# Patient Record
Sex: FEMALE | Race: WHITE | NOT HISPANIC OR LATINO
[De-identification: names, ages, dates, MRNs, and addresses within clinical notes are randomized per-mention and may not be internally consistent; named-entity substitution may affect disease eponyms.]

---

## 2021-06-18 ENCOUNTER — RESULT REVIEW (OUTPATIENT)
Age: 61
End: 2021-06-18

## 2021-08-03 ENCOUNTER — APPOINTMENT (OUTPATIENT)
Dept: OPHTHALMOLOGY | Facility: CLINIC | Age: 61
End: 2021-08-03
Payer: COMMERCIAL

## 2021-08-03 ENCOUNTER — NON-APPOINTMENT (OUTPATIENT)
Age: 61
End: 2021-08-03

## 2021-08-03 PROCEDURE — 92133 CPTRZD OPH DX IMG PST SGM ON: CPT

## 2021-08-03 PROCEDURE — 92014 COMPRE OPH EXAM EST PT 1/>: CPT

## 2021-12-27 DIAGNOSIS — E78.5 HYPERLIPIDEMIA, UNSPECIFIED: ICD-10-CM

## 2021-12-27 DIAGNOSIS — E03.9 HYPOTHYROIDISM, UNSPECIFIED: ICD-10-CM

## 2021-12-27 PROBLEM — Z00.00 ENCOUNTER FOR PREVENTIVE HEALTH EXAMINATION: Status: ACTIVE | Noted: 2021-12-27

## 2021-12-27 RX ORDER — MELOXICAM 15 MG/1
TABLET ORAL
Refills: 0 | Status: ACTIVE | COMMUNITY

## 2021-12-27 RX ORDER — SIMVASTATIN 40 MG/1
TABLET, FILM COATED ORAL
Refills: 0 | Status: ACTIVE | COMMUNITY

## 2021-12-27 RX ORDER — LEVOTHYROXINE SODIUM 0.17 MG/1
TABLET ORAL
Refills: 0 | Status: ACTIVE | COMMUNITY

## 2021-12-27 RX ORDER — CYCLOBENZAPRINE HCL 5 MG
5 TABLET ORAL
Refills: 0 | Status: ACTIVE | COMMUNITY

## 2021-12-29 ENCOUNTER — APPOINTMENT (OUTPATIENT)
Dept: PHYSICAL MEDICINE AND REHAB | Facility: CLINIC | Age: 61
End: 2021-12-29

## 2021-12-29 VITALS
HEART RATE: 70 BPM | SYSTOLIC BLOOD PRESSURE: 130 MMHG | OXYGEN SATURATION: 100 % | DIASTOLIC BLOOD PRESSURE: 80 MMHG | TEMPERATURE: 98.7 F

## 2021-12-29 NOTE — HISTORY OF PRESENT ILLNESS
[FreeTextEntry1] : Pt continues with c/o low back pain with intermittent radiation of pain and paresthesia involving her bilateral lower extremities.  [FreeTextEntry2] :  for School 02/17/2011  [FreeTextEntry6] : prolonged sitting [FreeTextEntry4] : no [FreeTextEntry5] : no [Has the patient missed work because of the injury/illness?] : The patient has missed work because of the injury/illness. [Yes] : The patient is currently working.

## 2021-12-29 NOTE — ASSESSMENT
[FreeTextEntry1] : HEP \par Recheck 4 to 6 weeks  [Indicate if, in your opinion, the incident that the patient described was the competent medical cause of this injury/illness.] : The incident that the patient described was the competent medical cause of this injury/illness: Yes [Indicate if the patient's complaints are consistent with his/her history of the injury/illness.] : Indicate if the patient's complaints are consistent with his/her history of the injury/illness: Yes [Can the patient return to usual work activities as indicated? If yes, indicate date___] : The patient can return to usual work activities on [unfilled] [FreeTextEntry5] : 68

## 2021-12-29 NOTE — PHYSICAL EXAM
[FreeTextEntry1] : Examination of the Lumbar Spine \par Flexion 50 degrees\par Extension 10 degrees\par Lateral Bend R 26 degrees L 24 degrees \par \par Palpation of the Lumbar Spine: tenderness and spasm involving her bilateral lower lumbar paraspinal musculature\par \par \par MMT L/E: Intact \par SLR + 40 degrees bilaterally \par \par   [Normal] : The posterior cervical, anterior cervical, supraclavicular, axillary, femoral and inguinal nodes were non-tender and normal size [de-identified] : see exam  [de-identified] : see exam  [de-identified] : see exam

## 2022-02-23 ENCOUNTER — APPOINTMENT (OUTPATIENT)
Dept: PHYSICAL MEDICINE AND REHAB | Facility: CLINIC | Age: 62
End: 2022-02-23

## 2022-02-23 VITALS
SYSTOLIC BLOOD PRESSURE: 128 MMHG | TEMPERATURE: 97.8 F | DIASTOLIC BLOOD PRESSURE: 80 MMHG | HEART RATE: 68 BPM | OXYGEN SATURATION: 99 %

## 2022-02-23 NOTE — HISTORY OF PRESENT ILLNESS
[FreeTextEntry2] :  for School 02/17/2011  [FreeTextEntry1] : Pt continues with c/o low back pain with intermittent radiation of pain and paresthesia involving her bilateral lower extremities.  [FreeTextEntry6] : prolonged sitting [FreeTextEntry4] : no [FreeTextEntry5] : no [Has the patient missed work because of the injury/illness?] : The patient has missed work because of the injury/illness. [Yes] : The patient is currently working.

## 2022-02-23 NOTE — PHYSICAL EXAM
[FreeTextEntry1] : Examination of the Lumbar Spine \par Flexion 52 degrees\par Extension 8 degrees\par Lateral Bend R 24 degrees L 22 degrees \par \par Palpation of the Lumbar Spine: tenderness and spasm involving her bilateral lower lumbar paraspinal musculature\par \par \par MMT L/E: Intact \par SLR + 40 degrees bilaterally \par \par   [Normal] : The posterior cervical, anterior cervical, supraclavicular, axillary, femoral and inguinal nodes were non-tender and normal size [de-identified] : see exam  [de-identified] : see exam  [de-identified] : see exam

## 2022-02-23 NOTE — ASSESSMENT
[FreeTextEntry1] : HEP\par Flexeril 10mg po TID #90 prn  \par Recheck 4 to 6 weeks  [Indicate if, in your opinion, the incident that the patient described was the competent medical cause of this injury/illness.] : The incident that the patient described was the competent medical cause of this injury/illness: Yes [Indicate if the patient's complaints are consistent with his/her history of the injury/illness.] : Indicate if the patient's complaints are consistent with his/her history of the injury/illness: Yes [Yes] : Yes, it is consistent [Can the patient return to usual work activities as indicated? If yes, indicate date___] : The patient can return to usual work activities on [unfilled] [FreeTextEntry5] : 68

## 2022-03-30 ENCOUNTER — APPOINTMENT (OUTPATIENT)
Dept: PHYSICAL MEDICINE AND REHAB | Facility: CLINIC | Age: 62
End: 2022-03-30

## 2022-03-30 NOTE — PHYSICAL EXAM
[Normal] : The posterior cervical, anterior cervical, supraclavicular, axillary, femoral and inguinal nodes were non-tender and normal size [FreeTextEntry1] : Examination of the Lumbar Spine \par Flexion 50 degrees\par Extension 8 degrees\par Lateral Bend R 24 degrees L 22 degrees \par \par Palpation of the Lumbar Spine: tenderness and spasm involving her bilateral lower lumbar paraspinal musculature\par \par \par MMT L/E: Intact \par SLR + 40 degrees bilaterally \par \par   [de-identified] : see exam  [de-identified] : see exam  [de-identified] : see exam

## 2022-03-30 NOTE — HISTORY OF PRESENT ILLNESS
[Has the patient missed work because of the injury/illness?] : The patient has missed work because of the injury/illness. [Yes] : The patient is currently working. [FreeTextEntry1] : Pt continues with c/o low back pain with intermittent radiation of pain and paresthesia involving her bilateral lower extremities. Pt reports improvement with flexeril.  [FreeTextEntry2] :  for School 02/17/2011  [FreeTextEntry6] : prolonged sitting [FreeTextEntry4] : no [FreeTextEntry5] : no

## 2022-03-30 NOTE — ASSESSMENT
[Indicate if, in your opinion, the incident that the patient described was the competent medical cause of this injury/illness.] : The incident that the patient described was the competent medical cause of this injury/illness: Yes [Indicate if the patient's complaints are consistent with his/her history of the injury/illness.] : Indicate if the patient's complaints are consistent with his/her history of the injury/illness: Yes [Yes] : Yes, it is consistent [Can the patient return to usual work activities as indicated? If yes, indicate date___] : The patient can return to usual work activities on [unfilled] [FreeTextEntry1] : HEP\par Continue with flexeril PRN \par Recheck 4 to 6 weeks  [FreeTextEntry5] : 68

## 2022-05-02 ENCOUNTER — APPOINTMENT (OUTPATIENT)
Dept: PHYSICAL MEDICINE AND REHAB | Facility: CLINIC | Age: 62
End: 2022-05-02
Payer: COMMERCIAL

## 2022-05-02 PROCEDURE — 20610 DRAIN/INJ JOINT/BURSA W/O US: CPT

## 2022-05-02 PROCEDURE — 99213 OFFICE O/P EST LOW 20 MIN: CPT | Mod: 25

## 2022-05-02 RX ORDER — LIDOCAINE HYDROCHLORIDE 10 MG/ML
1 INJECTION, SOLUTION INFILTRATION; PERINEURAL
Refills: 0 | Status: COMPLETED | OUTPATIENT
Start: 2022-05-02

## 2022-05-02 RX ORDER — DEXAMETHASONE SODIUM PHOSPHATE 4 MG/ML
4 INJECTION, SOLUTION INTRAMUSCULAR; INTRAVENOUS
Qty: 0 | Refills: 0 | Status: COMPLETED | OUTPATIENT
Start: 2022-05-02

## 2022-05-02 NOTE — HISTORY OF PRESENT ILLNESS
[FreeTextEntry1] : Pt is a 62 year old female with c/o right knee pain with limited ROM. Pt reports improvement involving last injection to the Pes Anserine Bursa over the past 2 weeks reporting increasing knee pain.

## 2022-05-02 NOTE — ASSESSMENT
[FreeTextEntry1] : Inject right knee Pes Anserine Bursa 4 cc 1 % Lidocaine HCL, 1 cc Dexamethasone\par PT decline diagnostic testing. \par Pt declines ortho consult.\par Recheck in 4 weeks

## 2022-05-02 NOTE — PHYSICAL EXAM
[FreeTextEntry1] : Sterile Technique Injection \par 1 Syringes of 4 cc 1 % Lidocaine HCL \par                        1 cc Dexamethasone \par \par Right Pes Anserine Bursa \par \par Ice injection site PRN \par Injection tolerated well.\par \par \par Knee Examination \par Flexion: 110 degrees\par Extension: full\par \par Palpation:Medial joint line and Pes Anserine Bursa tender.\par \par MMT: Grossly intact\par \par Swelling: -\par No increase temperature: -\par Patella Compression: -\par Grayson Test: with pain\par Negative gross instability \par

## 2022-05-11 ENCOUNTER — APPOINTMENT (OUTPATIENT)
Dept: PHYSICAL MEDICINE AND REHAB | Facility: CLINIC | Age: 62
End: 2022-05-11
Payer: OTHER MISCELLANEOUS

## 2022-05-11 PROCEDURE — 99072 ADDL SUPL MATRL&STAF TM PHE: CPT

## 2022-05-11 PROCEDURE — 99213 OFFICE O/P EST LOW 20 MIN: CPT

## 2022-05-11 NOTE — PHYSICAL EXAM
[Normal] : The posterior cervical, anterior cervical, supraclavicular, axillary, femoral and inguinal nodes were non-tender and normal size [FreeTextEntry1] : Examination of the Lumbar Spine \par Flexion 52 degrees\par Extension 10 degrees\par Lateral Bend R 27 degrees L 20 degrees \par \par Palpation of the Lumbar Spine: tenderness and spasm involving her bilateral lower lumbar paraspinal musculature\par \par \par MMT L/E: Intact \par \par   [de-identified] : see exam  [de-identified] : see exam  [de-identified] : see exam

## 2022-06-07 ENCOUNTER — APPOINTMENT (OUTPATIENT)
Dept: PHYSICAL MEDICINE AND REHAB | Facility: CLINIC | Age: 62
End: 2022-06-07

## 2022-06-25 ENCOUNTER — RESULT REVIEW (OUTPATIENT)
Age: 62
End: 2022-06-25

## 2022-06-29 ENCOUNTER — APPOINTMENT (OUTPATIENT)
Dept: PHYSICAL MEDICINE AND REHAB | Facility: CLINIC | Age: 62
End: 2022-06-29
Payer: OTHER MISCELLANEOUS

## 2022-06-29 PROCEDURE — 99072 ADDL SUPL MATRL&STAF TM PHE: CPT

## 2022-06-29 PROCEDURE — 99213 OFFICE O/P EST LOW 20 MIN: CPT

## 2022-06-29 NOTE — ASSESSMENT
[Indicate if, in your opinion, the incident that the patient described was the competent medical cause of this injury/illness.] : The incident that the patient described was the competent medical cause of this injury/illness: Yes [Indicate if the patient's complaints are consistent with his/her history of the injury/illness.] : Indicate if the patient's complaints are consistent with his/her history of the injury/illness: Yes [Yes] : Yes, it is consistent [Can the patient return to usual work activities as indicated? If yes, indicate date___] : The patient can return to usual work activities on [unfilled] [FreeTextEntry1] : Request series of 6 trigger point injections to L/S. The goal of which is to dissipate persistent trigger points and muscle spasm involving the lumbar and gluteal musculature. \par \par HEP\par Continue with flexeril PRN \par \par Recheck 4 to 6 weeks  [FreeTextEntry5] : 68

## 2022-06-29 NOTE — HISTORY OF PRESENT ILLNESS
[Has the patient missed work because of the injury/illness?] : The patient has missed work because of the injury/illness. [Yes] : The patient is currently working. [FreeTextEntry1] : Pt continues with c/o low back pain with intermittent radiation of pain Pt reports some improvement with Flexeril, as well as using heat, ice and HEP. [FreeTextEntry2] :  for School 02/17/2011  [FreeTextEntry6] : prolonged sitting [FreeTextEntry4] : no [FreeTextEntry5] : no

## 2022-06-29 NOTE — PHYSICAL EXAM
[Normal] : The posterior cervical, anterior cervical, supraclavicular, axillary, femoral and inguinal nodes were non-tender and normal size [FreeTextEntry1] : Examination of the Lumbar Spine \par Flexion 50 degrees\par Extension 10 degrees\par Lateral Bend R 27 degrees L 25 degrees \par \par Palpation of the Lumbar Spine: Decreased tenderness and spasm involving her bilateral lower lumbar paraspinal musculature. Trigger points left gluteal musculature. \par \par \par MMT L/E: Intact \par \par   [de-identified] : see exam  [de-identified] : see exam  [de-identified] : see exam

## 2022-07-20 ENCOUNTER — APPOINTMENT (OUTPATIENT)
Dept: PHYSICAL MEDICINE AND REHAB | Facility: CLINIC | Age: 62
End: 2022-07-20

## 2022-07-20 PROCEDURE — 99072 ADDL SUPL MATRL&STAF TM PHE: CPT

## 2022-07-20 PROCEDURE — 99212 OFFICE O/P EST SF 10 MIN: CPT

## 2022-07-20 NOTE — PHYSICAL EXAM
[FreeTextEntry1] : Examination of the Lumbar Spine \par Flexion 45 degrees\par Extension 5 degrees\par Lateral Bend R 20 degrees L 22 degrees \par \par Palpation of the Lumbar Spine: tenderness and spasm involving her bilateral lower lumbar paraspinal musculature\par Trigger points bilateral gluteal musculature with greater involvement on the left\par \par MMT L/E: Intact \par SLR + 40 degrees bilaterally \par \par   [Normal] : The posterior cervical, anterior cervical, supraclavicular, axillary, femoral and inguinal nodes were non-tender and normal size [de-identified] : see exam  [de-identified] : see exam  [de-identified] : see exam

## 2022-07-20 NOTE — HISTORY OF PRESENT ILLNESS
[FreeTextEntry1] : Pt continues with c/o low back pain with intermittent radiation of pain and paresthesia involving her bilateral lower extremities.  Using Flexeril on a as needed basis.  Patient reports that she has received authorization for 1 trigger point injection. [FreeTextEntry2] :  for School 02/17/2011  [FreeTextEntry6] : prolonged sitting [FreeTextEntry4] : no [FreeTextEntry5] : no [Has the patient missed work because of the injury/illness?] : The patient has missed work because of the injury/illness. [Yes] : The patient is currently working.

## 2022-07-20 NOTE — ASSESSMENT
[FreeTextEntry1] : HEP\par Continue with flexeril PRN\par Patient to schedule trigger point injection to the lumbar spine and gluteal musculature\par Recheck 4 weeks [Indicate if, in your opinion, the incident that the patient described was the competent medical cause of this injury/illness.] : The incident that the patient described was the competent medical cause of this injury/illness: Yes [Indicate if the patient's complaints are consistent with his/her history of the injury/illness.] : Indicate if the patient's complaints are consistent with his/her history of the injury/illness: Yes [Yes] : Yes, it is consistent [Can the patient return to usual work activities as indicated? If yes, indicate date___] : The patient can return to usual work activities on [unfilled] [FreeTextEntry5] : 68

## 2022-07-27 ENCOUNTER — APPOINTMENT (OUTPATIENT)
Dept: PHYSICAL MEDICINE AND REHAB | Facility: CLINIC | Age: 62
End: 2022-07-27

## 2022-08-02 ENCOUNTER — APPOINTMENT (OUTPATIENT)
Dept: PHYSICAL MEDICINE AND REHAB | Facility: CLINIC | Age: 62
End: 2022-08-02

## 2022-08-02 PROCEDURE — 99213 OFFICE O/P EST LOW 20 MIN: CPT | Mod: 25

## 2022-08-02 PROCEDURE — 20553 NJX 1/MLT TRIGGER POINTS 3/>: CPT

## 2022-08-02 PROCEDURE — 99072 ADDL SUPL MATRL&STAF TM PHE: CPT

## 2022-08-02 RX ADMIN — Medication 10 %: at 00:00

## 2022-08-10 ENCOUNTER — NON-APPOINTMENT (OUTPATIENT)
Age: 62
End: 2022-08-10

## 2022-08-10 ENCOUNTER — APPOINTMENT (OUTPATIENT)
Dept: OPHTHALMOLOGY | Facility: CLINIC | Age: 62
End: 2022-08-10

## 2022-08-10 PROCEDURE — 92250 FUNDUS PHOTOGRAPHY W/I&R: CPT

## 2022-08-10 PROCEDURE — 92014 COMPRE OPH EXAM EST PT 1/>: CPT

## 2022-08-31 ENCOUNTER — APPOINTMENT (OUTPATIENT)
Dept: PHYSICAL MEDICINE AND REHAB | Facility: CLINIC | Age: 62
End: 2022-08-31

## 2022-08-31 PROCEDURE — 99213 OFFICE O/P EST LOW 20 MIN: CPT

## 2022-08-31 PROCEDURE — 99072 ADDL SUPL MATRL&STAF TM PHE: CPT

## 2022-08-31 NOTE — PHYSICAL EXAM
[Normal] : The posterior cervical, anterior cervical, supraclavicular, axillary, femoral and inguinal nodes were non-tender and normal size [FreeTextEntry1] : Examination of the Lumbar Spine \par Flexion 48 degrees\par Extension 8 degrees\par Lateral Bend R 20 degrees L 22 degrees \par \par Palpation of the Lumbar Spine: tenderness and spasm involving her bilateral lower lumbar paraspinal musculature\par Trigger points bilateral gluteal musculature with greater involvement on the left.  However some improvement following last trigger point injection.\par \par MMT L/E: Intact \par SLR + 40 degrees bilaterally \par \par   [de-identified] : see exam  [de-identified] : see exam  [de-identified] : see exam

## 2022-08-31 NOTE — PROCEDURE
[de-identified] : Sterile Technique Injection \par 2 Syringes of 5 cc 1 % Lidocaine HCL \par \par Bilateral Gluteus medius \par Bilateral Gluteus yoana \par \par Ice injection site PRN \par Injection tolerated well.\par \par \par \par \par

## 2022-08-31 NOTE — ASSESSMENT
[Indicate if, in your opinion, the incident that the patient described was the competent medical cause of this injury/illness.] : The incident that the patient described was the competent medical cause of this injury/illness: Yes [Indicate if the patient's complaints are consistent with his/her history of the injury/illness.] : Indicate if the patient's complaints are consistent with his/her history of the injury/illness: Yes [Yes] : Yes, it is consistent [Can the patient return to usual work activities as indicated? If yes, indicate date___] : The patient can return to usual work activities on [unfilled] [FreeTextEntry1] : HEP\par Continue with flexeril PRN\par Request authorization for 8 sessions of trigger point injections to the lumbar paraspinal and gluteal musculature.  MMI has not yet been achieved.  Additional trigger point injections would be beneficial and decreasing pain, and dissipating persistent muscle spasm.\par Recheck 4 weeks [FreeTextEntry5] : 68

## 2022-08-31 NOTE — HISTORY OF PRESENT ILLNESS
[Has the patient missed work because of the injury/illness?] : The patient has missed work because of the injury/illness. [Yes] : The patient is currently working. [FreeTextEntry1] : Pt continues with c/o low back pain with intermittent radiation of pain and paresthesia involving her bilateral lower extremities.  However does report she noted some improvement following trigger point injections involving bilateral gluteal musculature. [FreeTextEntry2] :  for School 02/17/2011  [FreeTextEntry6] : prolonged sitting [FreeTextEntry4] : no [FreeTextEntry5] : no

## 2022-11-09 ENCOUNTER — APPOINTMENT (OUTPATIENT)
Dept: PHYSICAL MEDICINE AND REHAB | Facility: CLINIC | Age: 62
End: 2022-11-09

## 2022-11-09 PROCEDURE — 99213 OFFICE O/P EST LOW 20 MIN: CPT

## 2022-11-09 PROCEDURE — 99072 ADDL SUPL MATRL&STAF TM PHE: CPT

## 2022-11-09 NOTE — PHYSICAL EXAM
[Normal] : The posterior cervical, anterior cervical, supraclavicular, axillary, femoral and inguinal nodes were non-tender and normal size [FreeTextEntry1] : Examination of the Lumbar Spine \par Flexion 50 degrees\par Extension 8 degrees\par Lateral Bend R 20 degrees L 22 degrees \par \par Palpation of the Lumbar Spine: tenderness and spasm involving her bilateral lower lumbar paraspinal musculature\par Trigger points bilateral gluteal musculature with greater involvement on the left. \par \par MMT L/E: Intact \par SLR + 40 degrees bilaterally \par \par   [de-identified] : see exam  [de-identified] : see exam  [de-identified] : see exam

## 2022-11-09 NOTE — HISTORY OF PRESENT ILLNESS
[Has the patient missed work because of the injury/illness?] : The patient has missed work because of the injury/illness. [Yes] : The patient is currently working. [FreeTextEntry1] : Pt continues with c/o low back pain with intermittent radiation of pain and paresthesia involving her bilateral lower extremities.  Pt reports  some improvement with trigger point injections.  [FreeTextEntry2] :  for School 02/17/2011  [FreeTextEntry6] : prolonged sitting [FreeTextEntry4] : no [FreeTextEntry5] : no

## 2022-11-09 NOTE — PROCEDURE
[de-identified] : Sterile Technique Injection \par 2 Syringes of 5 cc 1 % Lidocaine HCL \par \par Bilateral Gluteus medius \par Bilateral Gluteus yoana \par \par Ice injection site PRN \par Injection tolerated well.\par \par \par \par \par

## 2022-11-09 NOTE — ASSESSMENT
[Indicate if, in your opinion, the incident that the patient described was the competent medical cause of this injury/illness.] : The incident that the patient described was the competent medical cause of this injury/illness: Yes [Indicate if the patient's complaints are consistent with his/her history of the injury/illness.] : Indicate if the patient's complaints are consistent with his/her history of the injury/illness: Yes [Yes] : Yes, it is consistent [Can the patient return to usual work activities as indicated? If yes, indicate date___] : The patient can return to usual work activities on [unfilled] [FreeTextEntry1] : HEP\par Continue with flexeril PRN\par \par Request authorization for 8 sessions of trigger point injections to the lumbar paraspinal and gluteal musculature.  MMI has not yet been achieved.  Additional trigger point injections would be beneficial and decreasing pain, and dissipating persistent muscle spasm.\par Recheck 4 weeks [FreeTextEntry5] : 68

## 2022-11-16 ENCOUNTER — APPOINTMENT (OUTPATIENT)
Dept: PHYSICAL MEDICINE AND REHAB | Facility: CLINIC | Age: 62
End: 2022-11-16

## 2022-11-16 PROCEDURE — 20553 NJX 1/MLT TRIGGER POINTS 3/>: CPT

## 2022-11-16 PROCEDURE — 99072 ADDL SUPL MATRL&STAF TM PHE: CPT

## 2022-11-16 PROCEDURE — 99213 OFFICE O/P EST LOW 20 MIN: CPT | Mod: 25

## 2022-11-16 NOTE — PROCEDURE
[de-identified] : Sterile Technique Injection \par 2 Syringes of 5 cc 1 % Lidocaine HCL \par \par left Gluteus medius \par left Gluteus yoana \par \par Ice injection site PRN \par Injection tolerated well.\par \par \par \par \par Flexion 50 degrees\par Extension 10 degrees\par Lateral Bend R 18 degrees L 20 degrees \par \par Palp: Trigger point involving the bilateral gluteal musculature

## 2022-11-23 ENCOUNTER — APPOINTMENT (OUTPATIENT)
Dept: PHYSICAL MEDICINE AND REHAB | Facility: CLINIC | Age: 62
End: 2022-11-23

## 2022-11-23 PROCEDURE — 99213 OFFICE O/P EST LOW 20 MIN: CPT | Mod: 25

## 2022-11-23 PROCEDURE — 99072 ADDL SUPL MATRL&STAF TM PHE: CPT

## 2022-11-23 PROCEDURE — 20553 NJX 1/MLT TRIGGER POINTS 3/>: CPT

## 2022-11-29 ENCOUNTER — APPOINTMENT (OUTPATIENT)
Dept: PHYSICAL MEDICINE AND REHAB | Facility: CLINIC | Age: 62
End: 2022-11-29

## 2022-11-29 DIAGNOSIS — M77.9 ENTHESOPATHY, UNSPECIFIED: ICD-10-CM

## 2022-11-29 DIAGNOSIS — M17.10 UNILATERAL PRIMARY OSTEOARTHRITIS, UNSPECIFIED KNEE: ICD-10-CM

## 2022-11-29 DIAGNOSIS — M70.50 OTHER BURSITIS OF KNEE, UNSPECIFIED KNEE: ICD-10-CM

## 2022-11-29 PROCEDURE — 20610 DRAIN/INJ JOINT/BURSA W/O US: CPT

## 2022-11-29 PROCEDURE — 99214 OFFICE O/P EST MOD 30 MIN: CPT | Mod: 25

## 2022-11-29 RX ADMIN — Medication 4 %: at 00:00

## 2022-11-29 NOTE — PROCEDURE
[de-identified] : Pt is complaining of right knee pain. She is scheduled for a total knee replacement in June 2023. \par \par \par EXAMINATION OF THE RIGHT KNEE:   \par \par Flexion: 105 degrees\par Extension: Full\par Palpation: Medial joint line and pes anserine bursa tender\par MMT: Grossly intact\par Swelling: -\par No increased temperature\par Negative patella compression\par Alina test positive with pain\par Negative gross instability\par Crepitus with ranging\par \par \par Sterile Technique Injection \par 1 Syringe of 4 cc 1 % Lidocaine HCL; 1 cc dexamethasone \par \par Right Pes anserine Bursa \par \par Ice injection site PRN \par Injection tolerated well.\par \par    \par \par \par \par

## 2022-11-30 ENCOUNTER — APPOINTMENT (OUTPATIENT)
Dept: PHYSICAL MEDICINE AND REHAB | Facility: CLINIC | Age: 62
End: 2022-11-30

## 2022-11-30 PROCEDURE — 99072 ADDL SUPL MATRL&STAF TM PHE: CPT

## 2022-11-30 PROCEDURE — 20553 NJX 1/MLT TRIGGER POINTS 3/>: CPT | Mod: 59

## 2022-11-30 PROCEDURE — 99213 OFFICE O/P EST LOW 20 MIN: CPT | Mod: 25

## 2022-11-30 NOTE — PROCEDURE
[de-identified] : Sterile Technique Injection \par 2 Syringes of 5 cc 1 % Lidocaine HCL \par \par left Gluteus medius \par left Gluteus yoana \par Left piriformis\par Ice injection site PRN \par Injection tolerated well.\par \par \par \par \par Flexion 45 degrees\par Extension 10 degrees\par Lateral Bend R 22 degrees L 20 degrees \par \par Palp: Trigger point involving the bilateral gluteal musculature with greater involvement on the left

## 2022-12-07 ENCOUNTER — APPOINTMENT (OUTPATIENT)
Dept: PHYSICAL MEDICINE AND REHAB | Facility: CLINIC | Age: 62
End: 2022-12-07

## 2022-12-07 PROCEDURE — 99072 ADDL SUPL MATRL&STAF TM PHE: CPT

## 2022-12-07 PROCEDURE — 99213 OFFICE O/P EST LOW 20 MIN: CPT | Mod: 25

## 2022-12-07 PROCEDURE — 20553 NJX 1/MLT TRIGGER POINTS 3/>: CPT

## 2022-12-07 NOTE — PROCEDURE
[de-identified] : Sterile Technique Injection \par 2 Syringes of 5 cc 1 % Lidocaine HCL \par \par left Gluteus medius \par left Gluteus yoana \par Left piriformis\par Ice injection site PRN \par Injection tolerated well.\par \par \par Flexion 45 degrees\par Extension 10 degrees\par Lateral Bend R 22 degrees L 20 degrees \par \par Palp: Trigger point involving the bilateral gluteal musculature with greater involvement on the left

## 2022-12-14 ENCOUNTER — APPOINTMENT (OUTPATIENT)
Dept: PHYSICAL MEDICINE AND REHAB | Facility: CLINIC | Age: 62
End: 2022-12-14

## 2022-12-14 PROCEDURE — 99213 OFFICE O/P EST LOW 20 MIN: CPT | Mod: 25

## 2022-12-14 PROCEDURE — 99072 ADDL SUPL MATRL&STAF TM PHE: CPT

## 2022-12-14 PROCEDURE — 20553 NJX 1/MLT TRIGGER POINTS 3/>: CPT

## 2022-12-14 NOTE — PROCEDURE
[de-identified] : Sterile Technique Injection \par 2 Syringes of 5 cc 1 % Lidocaine HCL \par \par left Gluteus medius \par left Gluteus yoana \par Left piriformis\par Ice injection site PRN \par Injection tolerated well.\par \par \par Flexion 45 degrees\par Extension 10 degrees\par Lateral Bend R 22 degrees L 20 degrees \par \par Palp: Trigger point involving the bilateral gluteal musculature with greater involvement on the left

## 2022-12-19 ENCOUNTER — APPOINTMENT (OUTPATIENT)
Dept: PHYSICAL MEDICINE AND REHAB | Facility: CLINIC | Age: 62
End: 2022-12-19

## 2022-12-21 ENCOUNTER — APPOINTMENT (OUTPATIENT)
Dept: PHYSICAL MEDICINE AND REHAB | Facility: CLINIC | Age: 62
End: 2022-12-21

## 2022-12-21 PROCEDURE — 20553 NJX 1/MLT TRIGGER POINTS 3/>: CPT

## 2022-12-21 PROCEDURE — 99072 ADDL SUPL MATRL&STAF TM PHE: CPT

## 2022-12-21 PROCEDURE — 99213 OFFICE O/P EST LOW 20 MIN: CPT | Mod: 25

## 2022-12-21 NOTE — PROCEDURE
[de-identified] : Sterile Technique Injection \par 2 Syringes of 5 cc 1 % Lidocaine HCL \par \par left Gluteus medius \par left Gluteus yoana \par Left piriformis\par Ice injection site PRN \par Injection tolerated well.\par Patient reports trigger point injections are beneficial in decreasing pain\par \par Flexion 50 degrees\par Extension 10 degrees\par Lateral Bend R 28 degrees L 22 degrees \par \par Palp: Trigger points left gluteal musculature improving but persist

## 2022-12-28 ENCOUNTER — APPOINTMENT (OUTPATIENT)
Dept: PHYSICAL MEDICINE AND REHAB | Facility: CLINIC | Age: 62
End: 2022-12-28

## 2022-12-28 PROCEDURE — 20553 NJX 1/MLT TRIGGER POINTS 3/>: CPT

## 2022-12-28 PROCEDURE — 99072 ADDL SUPL MATRL&STAF TM PHE: CPT

## 2022-12-28 PROCEDURE — 99213 OFFICE O/P EST LOW 20 MIN: CPT | Mod: 25

## 2022-12-28 NOTE — PROCEDURE
[de-identified] : Sterile Technique Injection \par 2 Syringes of 4 cc 1 % Lidocaine HCL \par                         1 cc dexamethasone \par left Gluteus medius \par left Gluteus yoana \par Left piriformis\par Ice injection site PRN \par Injection tolerated well.\par Patient reports trigger point injections are beneficial in decreasing pain\par \par Flexion 52 degrees\par Extension 12 degrees\par Lateral Bend R 28 degrees L 22 degrees \par \par Palp: Trigger points left gluteal musculature improving but persist

## 2023-01-04 ENCOUNTER — APPOINTMENT (OUTPATIENT)
Dept: PHYSICAL MEDICINE AND REHAB | Facility: CLINIC | Age: 63
End: 2023-01-04
Payer: OTHER MISCELLANEOUS

## 2023-01-04 PROCEDURE — 20553 NJX 1/MLT TRIGGER POINTS 3/>: CPT

## 2023-01-04 PROCEDURE — 99072 ADDL SUPL MATRL&STAF TM PHE: CPT

## 2023-01-04 PROCEDURE — 99213 OFFICE O/P EST LOW 20 MIN: CPT | Mod: 25

## 2023-01-04 NOTE — PROCEDURE
[de-identified] : Sterile Technique Injection \par 2 Syringes of 4 cc 1 % Lidocaine HCL \par                         1 cc dexamethasone \par left Gluteus medius \par left Gluteus yoana \par Left piriformis\par Ice injection site PRN \par Injection tolerated well.\par Patient reports trigger point injections are beneficial in decreasing pain\par \par Flexion 50 degrees\par Extension 12 degrees\par Lateral Bend R 28 degrees L 22 degrees \par \par Palp: Trigger points left gluteal musculature improving but persist

## 2023-01-09 ENCOUNTER — APPOINTMENT (OUTPATIENT)
Dept: PHYSICAL MEDICINE AND REHAB | Facility: CLINIC | Age: 63
End: 2023-01-09
Payer: OTHER MISCELLANEOUS

## 2023-01-09 PROCEDURE — 99213 OFFICE O/P EST LOW 20 MIN: CPT

## 2023-01-09 PROCEDURE — 99072 ADDL SUPL MATRL&STAF TM PHE: CPT

## 2023-01-09 NOTE — HISTORY OF PRESENT ILLNESS
[FreeTextEntry1] : Patient reports trigger point injection therapy has been beneficial in terms of decreasing her low back pain and improving range of motion and level of function. [FreeTextEntry2] :  for School 02/17/2011  [FreeTextEntry6] : prolonged sitting [FreeTextEntry4] : no [FreeTextEntry5] : no [Has the patient missed work because of the injury/illness?] : The patient has missed work because of the injury/illness. [Yes] : The patient is currently working.

## 2023-01-09 NOTE — ASSESSMENT
[FreeTextEntry1] : HEP\par \par Recheck 4 weeks [Indicate if, in your opinion, the incident that the patient described was the competent medical cause of this injury/illness.] : The incident that the patient described was the competent medical cause of this injury/illness: Yes [Indicate if the patient's complaints are consistent with his/her history of the injury/illness.] : Indicate if the patient's complaints are consistent with his/her history of the injury/illness: Yes [Yes] : Yes, it is consistent [Can the patient return to usual work activities as indicated? If yes, indicate date___] : The patient can return to usual work activities on [unfilled] [FreeTextEntry5] : 68

## 2023-01-09 NOTE — PROCEDURE
[de-identified] : Sterile Technique Injection \par 2 Syringes of 5 cc 1 % Lidocaine HCL \par \par Bilateral Gluteus medius \par Bilateral Gluteus yoana \par \par Ice injection site PRN \par Injection tolerated well.\par \par \par \par \par

## 2023-01-09 NOTE — PHYSICAL EXAM
[FreeTextEntry1] : Examination of the Lumbar Spine \par Flexion 55 degrees\par Extension 10 degrees\par Lateral Bend R 25 degrees L 27 degrees \par \par Palpation of the Lumbar Spine: decrease tenderness and spasm involving her bilateral lower lumbar paraspinal musculature\par Trigger points bilateral gluteal musculature improved\par MMT L/E: Intact \par SLR + 40 degrees bilaterally \par \par   [Normal] : The posterior cervical, anterior cervical, supraclavicular, axillary, femoral and inguinal nodes were non-tender and normal size [de-identified] : see exam  [de-identified] : see exam  [de-identified] : see exam

## 2023-02-13 ENCOUNTER — APPOINTMENT (OUTPATIENT)
Dept: OPHTHALMOLOGY | Facility: CLINIC | Age: 63
End: 2023-02-13
Payer: COMMERCIAL

## 2023-02-13 ENCOUNTER — NON-APPOINTMENT (OUTPATIENT)
Age: 63
End: 2023-02-13

## 2023-02-13 PROCEDURE — 92201 OPSCPY EXTND RTA DRAW UNI/BI: CPT

## 2023-02-13 PROCEDURE — 92014 COMPRE OPH EXAM EST PT 1/>: CPT

## 2023-03-06 ENCOUNTER — NON-APPOINTMENT (OUTPATIENT)
Age: 63
End: 2023-03-06

## 2023-03-06 ENCOUNTER — APPOINTMENT (OUTPATIENT)
Dept: OPHTHALMOLOGY | Facility: CLINIC | Age: 63
End: 2023-03-06
Payer: COMMERCIAL

## 2023-03-06 PROCEDURE — 92201 OPSCPY EXTND RTA DRAW UNI/BI: CPT

## 2023-03-06 PROCEDURE — 92014 COMPRE OPH EXAM EST PT 1/>: CPT

## 2023-04-10 ENCOUNTER — APPOINTMENT (OUTPATIENT)
Dept: PHYSICAL MEDICINE AND REHAB | Facility: CLINIC | Age: 63
End: 2023-04-10
Payer: OTHER MISCELLANEOUS

## 2023-04-10 PROCEDURE — 99213 OFFICE O/P EST LOW 20 MIN: CPT

## 2023-04-10 NOTE — HISTORY OF PRESENT ILLNESS
[Has the patient missed work because of the injury/illness?] : The patient has missed work because of the injury/illness. [Yes] : The patient is currently working. [FreeTextEntry1] : Pt continues with c/o low back pain with intermittent radiation of pain and paresthesia involving her bilateral lower extremities.  [FreeTextEntry2] :  for School 02/17/2011  [FreeTextEntry6] : prolonged sitting [FreeTextEntry4] : no [FreeTextEntry5] : no

## 2023-04-10 NOTE — PHYSICAL EXAM
[Normal] : The posterior cervical, anterior cervical, supraclavicular, axillary, femoral and inguinal nodes were non-tender and normal size [FreeTextEntry1] : Examination of the Lumbar Spine \par Flexion 50 degrees\par Extension 10 degrees\par Lateral Bend R 20 degrees L 25 degrees \par \par Palpation of the Lumbar Spine: decrease tenderness and spasm involving her bilateral lower lumbar paraspinal musculature\par Trigger points bilateral gluteal musculature improved\par MMT L/E: Intact \par SLR + 40 degrees bilaterally \par \par   [de-identified] : see exam  [de-identified] : see exam  [de-identified] : see exam

## 2023-04-10 NOTE — PROCEDURE
[de-identified] : Sterile Technique Injection \par 2 Syringes of 5 cc 1 % Lidocaine HCL \par \par Bilateral Gluteus medius \par Bilateral Gluteus yoana \par \par Ice injection site PRN \par Injection tolerated well.\par \par \par \par \par

## 2023-04-10 NOTE — ASSESSMENT
[Indicate if, in your opinion, the incident that the patient described was the competent medical cause of this injury/illness.] : The incident that the patient described was the competent medical cause of this injury/illness: Yes [Indicate if the patient's complaints are consistent with his/her history of the injury/illness.] : Indicate if the patient's complaints are consistent with his/her history of the injury/illness: Yes [Yes] : Yes, it is consistent [Can the patient return to usual work activities as indicated? If yes, indicate date___] : The patient can return to usual work activities on [unfilled] [FreeTextEntry1] : HEP\par \par Recheck 4 weeks [FreeTextEntry5] : 68

## 2023-06-04 ENCOUNTER — NON-APPOINTMENT (OUTPATIENT)
Age: 63
End: 2023-06-04

## 2023-06-05 ENCOUNTER — NON-APPOINTMENT (OUTPATIENT)
Age: 63
End: 2023-06-05

## 2023-06-05 ENCOUNTER — APPOINTMENT (OUTPATIENT)
Dept: OPHTHALMOLOGY | Facility: CLINIC | Age: 63
End: 2023-06-05
Payer: COMMERCIAL

## 2023-06-05 PROCEDURE — 92012 INTRM OPH EXAM EST PATIENT: CPT

## 2023-06-05 PROCEDURE — 92201 OPSCPY EXTND RTA DRAW UNI/BI: CPT

## 2023-06-12 ENCOUNTER — APPOINTMENT (OUTPATIENT)
Dept: PHYSICAL MEDICINE AND REHAB | Facility: CLINIC | Age: 63
End: 2023-06-12
Payer: OTHER MISCELLANEOUS

## 2023-06-12 DIAGNOSIS — M17.5 OTHER UNILATERAL SECONDARY OSTEOARTHRITIS OF KNEE: ICD-10-CM

## 2023-06-12 PROCEDURE — 99213 OFFICE O/P EST LOW 20 MIN: CPT

## 2023-06-12 NOTE — PHYSICAL EXAM
[FreeTextEntry1] : Examination of the Lumbar Spine \par Flexion 45 degrees\par Extension 10 degrees\par Lateral Bend R 20 degrees L 20 degrees \par \par Palpation of the Lumbar Spine:  tenderness and spasm involving her bilateral lower lumbar paraspinal musculature\par Trigger points bilateral gluteal musculature improved\par MMT L/E: Intact \par SLR + 40 degrees bilaterally \par \par   [Normal] : Heart rate was normal and rhythm regular, normal S1 and S2, no gallops, no murmurs and no pericardial rub

## 2023-06-12 NOTE — HISTORY OF PRESENT ILLNESS
[FreeTextEntry1] : Pt continues with c/o low back pain with intermittent radiation of pain and paresthesia involving her bilateral lower extremities. Pt is schedule for TKR on 06/22/2023 [FreeTextEntry2] :  for School 02/17/2011  [FreeTextEntry6] : prolonged sitting [FreeTextEntry4] : no [FreeTextEntry5] : no [Has the patient missed work because of the injury/illness?] : The patient has missed work because of the injury/illness. [Yes] : The patient is currently working.

## 2023-06-12 NOTE — PROCEDURE
[de-identified] : Sterile Technique Injection \par 2 Syringes of 5 cc 1 % Lidocaine HCL \par \par Bilateral Gluteus medius \par Bilateral Gluteus oyana \par \par Ice injection site PRN \par Injection tolerated well.\par \par \par \par \par

## 2023-09-13 ENCOUNTER — APPOINTMENT (OUTPATIENT)
Dept: PHYSICAL MEDICINE AND REHAB | Facility: CLINIC | Age: 63
End: 2023-09-13
Payer: OTHER MISCELLANEOUS

## 2023-09-13 PROCEDURE — 99212 OFFICE O/P EST SF 10 MIN: CPT

## 2023-10-02 RX ORDER — CYCLOBENZAPRINE HYDROCHLORIDE 10 MG/1
10 TABLET, FILM COATED ORAL 3 TIMES DAILY
Qty: 90 | Refills: 0 | Status: ACTIVE | COMMUNITY
Start: 2022-02-23 | End: 1900-01-01

## 2023-11-13 ENCOUNTER — NON-APPOINTMENT (OUTPATIENT)
Age: 63
End: 2023-11-13

## 2023-11-13 ENCOUNTER — APPOINTMENT (OUTPATIENT)
Dept: OPHTHALMOLOGY | Facility: CLINIC | Age: 63
End: 2023-11-13
Payer: COMMERCIAL

## 2023-11-13 PROCEDURE — 92014 COMPRE OPH EXAM EST PT 1/>: CPT

## 2023-12-06 ENCOUNTER — APPOINTMENT (OUTPATIENT)
Dept: PHYSICAL MEDICINE AND REHAB | Facility: CLINIC | Age: 63
End: 2023-12-06
Payer: OTHER MISCELLANEOUS

## 2023-12-06 PROCEDURE — 99213 OFFICE O/P EST LOW 20 MIN: CPT

## 2023-12-06 RX ORDER — CYCLOBENZAPRINE HYDROCHLORIDE 10 MG/1
10 TABLET, FILM COATED ORAL 3 TIMES DAILY
Qty: 90 | Refills: 0 | Status: ACTIVE | COMMUNITY
Start: 2023-12-06 | End: 1900-01-01

## 2024-01-02 NOTE — HISTORY OF PRESENT ILLNESS
[FreeTextEntry1] : Patient is a 63 year old female who presents in my office for eval of low back pain with a duration of one month. Pt states she does not recall any precipitating evetnts prior to onset of low back pain.

## 2024-02-15 ENCOUNTER — APPOINTMENT (OUTPATIENT)
Dept: PHYSICAL MEDICINE AND REHAB | Facility: CLINIC | Age: 64
End: 2024-02-15
Payer: OTHER MISCELLANEOUS

## 2024-02-15 PROCEDURE — 99213 OFFICE O/P EST LOW 20 MIN: CPT

## 2024-02-15 RX ORDER — CYCLOBENZAPRINE HYDROCHLORIDE 10 MG/1
10 TABLET, FILM COATED ORAL 3 TIMES DAILY
Qty: 60 | Refills: 0 | Status: ACTIVE | COMMUNITY
Start: 2024-02-15 | End: 1900-01-01

## 2024-02-15 NOTE — PHYSICAL EXAM
[Normal] : Heart rate was normal and rhythm regular, normal S1 and S2, no gallops, no murmurs and no pericardial rub [FreeTextEntry1] : Examination of the Lumbar Spine  Flexion 55 degrees Extension 15 degrees Lateral Bend R 25 degrees L 20 degrees   Palpation of the Lumbar Spine:  mild to moderate tenderness and spasm involving her bilateral lower lumbar paraspinal musculature Trigger points bilateral gluteal musculature improved MMT L/E: Intact

## 2024-02-15 NOTE — HISTORY OF PRESENT ILLNESS
[Has the patient missed work because of the injury/illness?] : The patient has missed work because of the injury/illness. [Yes] : The patient is currently working. [FreeTextEntry1] : Patient reports improvement with PT as it relates to her low back pain. No longer reexperiencing radicular symptoms. Reporting doing well with HEP. Continues to c/o intermittent low back pain.  [FreeTextEntry2] :  for School 02/17/2011  [FreeTextEntry6] : prolonged sitting [FreeTextEntry4] : no [FreeTextEntry5] : no

## 2024-02-15 NOTE — ASSESSMENT
[Indicate if, in your opinion, the incident that the patient described was the competent medical cause of this injury/illness.] : The incident that the patient described was the competent medical cause of this injury/illness: Yes [Indicate if the patient's complaints are consistent with his/her history of the injury/illness.] : Indicate if the patient's complaints are consistent with his/her history of the injury/illness: Yes [Yes] : Yes, it is consistent [Can the patient return to usual work activities as indicated? If yes, indicate date___] : The patient can return to usual work activities on [unfilled] [FreeTextEntry1] :  Home exercise plan reviewed with patient. Stressed the importance for the need for frequent change in position from sit to stand and stand to sit, as well as use of weight shifting techniques to alleviate low back discomfort. Instruction in proper posturing, body mechanics and lifting techniques.   Flexeril 10 mg po TID PRN #[id number]   Recheck 3 months.    [FreeTextEntry5] : 68

## 2024-02-22 ENCOUNTER — NON-APPOINTMENT (OUTPATIENT)
Age: 64
End: 2024-02-22

## 2024-02-22 ENCOUNTER — APPOINTMENT (OUTPATIENT)
Dept: OPHTHALMOLOGY | Facility: CLINIC | Age: 64
End: 2024-02-22
Payer: COMMERCIAL

## 2024-02-22 PROCEDURE — 92012 INTRM OPH EXAM EST PATIENT: CPT

## 2024-03-28 ENCOUNTER — APPOINTMENT (OUTPATIENT)
Dept: OPHTHALMOLOGY | Facility: CLINIC | Age: 64
End: 2024-03-28
Payer: COMMERCIAL

## 2024-03-28 ENCOUNTER — NON-APPOINTMENT (OUTPATIENT)
Age: 64
End: 2024-03-28

## 2024-03-28 PROCEDURE — 92014 COMPRE OPH EXAM EST PT 1/>: CPT

## 2024-03-28 PROCEDURE — 92134 CPTRZ OPH DX IMG PST SGM RTA: CPT

## 2024-03-28 PROCEDURE — 92201 OPSCPY EXTND RTA DRAW UNI/BI: CPT

## 2024-04-18 ENCOUNTER — APPOINTMENT (OUTPATIENT)
Dept: OPHTHALMOLOGY | Facility: CLINIC | Age: 64
End: 2024-04-18
Payer: COMMERCIAL

## 2024-04-18 ENCOUNTER — NON-APPOINTMENT (OUTPATIENT)
Age: 64
End: 2024-04-18

## 2024-04-18 PROCEDURE — 92012 INTRM OPH EXAM EST PATIENT: CPT

## 2024-04-18 PROCEDURE — 92201 OPSCPY EXTND RTA DRAW UNI/BI: CPT

## 2024-05-13 ENCOUNTER — APPOINTMENT (OUTPATIENT)
Dept: PHYSICAL MEDICINE AND REHAB | Facility: CLINIC | Age: 64
End: 2024-05-13
Payer: OTHER MISCELLANEOUS

## 2024-05-13 DIAGNOSIS — M51.36 OTHER INTERVERTEBRAL DISC DEGENERATION, LUMBAR REGION: ICD-10-CM

## 2024-05-13 DIAGNOSIS — M62.838 OTHER MUSCLE SPASM: ICD-10-CM

## 2024-05-13 DIAGNOSIS — M54.16 RADICULOPATHY, LUMBAR REGION: ICD-10-CM

## 2024-05-13 DIAGNOSIS — M54.50 LOW BACK PAIN, UNSPECIFIED: ICD-10-CM

## 2024-05-13 DIAGNOSIS — M47.816 SPONDYLOSIS W/OUT MYELOPATHY OR RADICULOPATHY, LUMBAR REGION: ICD-10-CM

## 2024-05-13 DIAGNOSIS — M51.9 UNSPECIFIED THORACIC, THORACOLUMBAR AND LUMBOSACRAL INTERVERTEBRAL DISC DISORDER: ICD-10-CM

## 2024-05-13 PROCEDURE — 99213 OFFICE O/P EST LOW 20 MIN: CPT

## 2024-05-13 NOTE — PHYSICAL EXAM
[FreeTextEntry1] : Examination of the Lumbar Spine  Flexion 50 degrees Extension 15 degrees Lateral Bend R 20 degrees L 20 degrees   Palpation of the Lumbar Spine:  mild to moderate tenderness and spasm involving her bilateral lower lumbar paraspinal musculature Trigger points bilateral gluteal musculature improved MMT L/E: Intact  Reflexes 2 and symmetrical throughout   [Normal] : Heart rate was normal and rhythm regular, normal S1 and S2, no gallops, no murmurs and no pericardial rub

## 2024-05-13 NOTE — ASSESSMENT
[FreeTextEntry1] : Home exercise plan reviewed with patient. Stressed the importance for the need for frequent change in position from sit to stand and stand to sit, as well as use of weight shifting techniques to alleviate low back discomfort. Instruction in proper posturing, body mechanics and lifting techniques.   Continue Flexeril 10 mg po TID PRN #[id number]    At least 20 minutes was spent with patient face to face examining patient, reviewing findings/results, counseling patient and coordinating treatment program. Ample time was provided to answer any questions or address concerns to the patient's satisfaction.  Recheck 3 months.    [Indicate if, in your opinion, the incident that the patient described was the competent medical cause of this injury/illness.] : The incident that the patient described was the competent medical cause of this injury/illness: Yes [Indicate if the patient's complaints are consistent with his/her history of the injury/illness.] : Indicate if the patient's complaints are consistent with his/her history of the injury/illness: Yes [Yes] : Yes, it is consistent [Can the patient return to usual work activities as indicated? If yes, indicate date___] : The patient can return to usual work activities on [unfilled] [FreeTextEntry5] : 68

## 2024-05-13 NOTE — HISTORY OF PRESENT ILLNESS
[FreeTextEntry1] : Patient continues to complain of intermittent low back pain which is aggravated with increased level of activity as well as prolonged sitting standing and ambulation.  Patient denies radicular symptoms at the current time. [FreeTextEntry2] :  for School 02/17/2011  [FreeTextEntry6] : prolonged sitting [FreeTextEntry4] : no [FreeTextEntry5] : no [Has the patient missed work because of the injury/illness?] : The patient has missed work because of the injury/illness. [Yes] : The patient is currently working.

## 2024-07-17 ENCOUNTER — APPOINTMENT (OUTPATIENT)
Dept: PHYSICAL MEDICINE AND REHAB | Facility: CLINIC | Age: 64
End: 2024-07-17
Payer: OTHER MISCELLANEOUS

## 2024-07-17 DIAGNOSIS — M51.36 OTHER INTERVERTEBRAL DISC DEGENERATION, LUMBAR REGION: ICD-10-CM

## 2024-07-17 DIAGNOSIS — M54.16 RADICULOPATHY, LUMBAR REGION: ICD-10-CM

## 2024-07-17 DIAGNOSIS — M51.9 UNSPECIFIED THORACIC, THORACOLUMBAR AND LUMBOSACRAL INTERVERTEBRAL DISC DISORDER: ICD-10-CM

## 2024-07-17 DIAGNOSIS — M54.50 LOW BACK PAIN, UNSPECIFIED: ICD-10-CM

## 2024-07-17 PROCEDURE — 99213 OFFICE O/P EST LOW 20 MIN: CPT | Mod: 25

## 2024-07-17 RX ORDER — DICLOFENAC SODIUM AND MISOPROSTOL 75; 200 MG/1; UG/1
75-0.2 TABLET, DELAYED RELEASE ORAL
Qty: 60 | Refills: 0 | Status: ACTIVE | COMMUNITY
Start: 2024-07-17 | End: 1900-01-01

## 2024-08-12 ENCOUNTER — APPOINTMENT (OUTPATIENT)
Dept: PHYSICAL MEDICINE AND REHAB | Facility: CLINIC | Age: 64
End: 2024-08-12

## 2024-08-14 ENCOUNTER — APPOINTMENT (OUTPATIENT)
Dept: PHYSICAL MEDICINE AND REHAB | Facility: CLINIC | Age: 64
End: 2024-08-14
Payer: OTHER MISCELLANEOUS

## 2024-08-14 DIAGNOSIS — M54.50 LOW BACK PAIN, UNSPECIFIED: ICD-10-CM

## 2024-08-14 DIAGNOSIS — M62.838 OTHER MUSCLE SPASM: ICD-10-CM

## 2024-08-14 DIAGNOSIS — M54.16 RADICULOPATHY, LUMBAR REGION: ICD-10-CM

## 2024-08-14 DIAGNOSIS — M51.9 UNSPECIFIED THORACIC, THORACOLUMBAR AND LUMBOSACRAL INTERVERTEBRAL DISC DISORDER: ICD-10-CM

## 2024-08-14 DIAGNOSIS — M47.816 SPONDYLOSIS W/OUT MYELOPATHY OR RADICULOPATHY, LUMBAR REGION: ICD-10-CM

## 2024-08-14 DIAGNOSIS — M51.36 OTHER INTERVERTEBRAL DISC DEGENERATION, LUMBAR REGION: ICD-10-CM

## 2024-08-14 PROCEDURE — 99213 OFFICE O/P EST LOW 20 MIN: CPT

## 2024-08-14 NOTE — PHYSICAL EXAM
[FreeTextEntry1] : Examination of the Lumbar Spine  Flexion 40 degrees Extension 10 degrees Lateral Bend R 20 degrees L 20 degrees   Palpation of the Lumbar Spine: Tenderness and spasm bilateral lower lumbar paraspinal musculature trigger points involving bilateral gluteal musculature  MMT L/E: Intact  Reflexes 2 and symmetrical throughout  SLR positive at 30 degrees on the right SLR +40 degrees on the left [Normal] : Heart rate was normal and rhythm regular, normal S1 and S2, no gallops, no murmurs and no pericardial rub

## 2024-08-14 NOTE — HISTORY OF PRESENT ILLNESS
[FreeTextEntry1] : Patient complaining of increasing low back pain with increasing radicular symptoms involving her right lower extremity.  Patient reports that TPI therapy has been denied as per insurance carrier.  Patient states she is having increased difficulty performing ADL activity pain is aggravated with prolonged sitting standing and ambulation.  Presents today for reevaluation of low back pain [FreeTextEntry2] :  for School 02/17/2011  [FreeTextEntry6] : prolonged sitting [FreeTextEntry4] : no [FreeTextEntry5] : no [Has the patient missed work because of the injury/illness?] : The patient has missed work because of the injury/illness. [Yes] : The patient is currently working.

## 2024-08-14 NOTE — ASSESSMENT
[FreeTextEntry1] : Home exercise plan reviewed with patient. Stressed the importance for the need for frequent change in position from sit to stand and stand to sit, as well as use of weight shifting techniques to alleviate low back discomfort. Instruction in proper posturing, body mechanics and lifting techniques.   Continue with Flexeril and Arthrotec as needed Request MRI LS with IV sedation patient is severely claustrophobic. Request PT 2 times weekly for 6 weeks to the LS, modalities and therapeutic exercises as tolerated Request 6 sessions TPI therapy to the LS Goals of which are to decrease pain, dissipate muscle spasm, increase ROM and improve level of function.  At least 20 minutes was spent with patient face to face examining patient, reviewing findings/results, counseling patient and coordinating treatment program. Ample time was provided to answer any questions or address concerns to the patient's satisfaction.  Recheck 3 months.    [Indicate if, in your opinion, the incident that the patient described was the competent medical cause of this injury/illness.] : The incident that the patient described was the competent medical cause of this injury/illness: Yes [Indicate if the patient's complaints are consistent with his/her history of the injury/illness.] : Indicate if the patient's complaints are consistent with his/her history of the injury/illness: Yes [Yes] : Yes, it is consistent [Can the patient return to usual work activities as indicated? If yes, indicate date___] : The patient can return to usual work activities on [unfilled] [FreeTextEntry5] : 68

## 2024-09-05 ENCOUNTER — APPOINTMENT (OUTPATIENT)
Dept: OPHTHALMOLOGY | Facility: CLINIC | Age: 64
End: 2024-09-05
Payer: COMMERCIAL

## 2024-09-05 ENCOUNTER — NON-APPOINTMENT (OUTPATIENT)
Age: 64
End: 2024-09-05

## 2024-09-05 PROCEDURE — 92014 COMPRE OPH EXAM EST PT 1/>: CPT

## 2024-09-25 ENCOUNTER — APPOINTMENT (OUTPATIENT)
Dept: PHYSICAL MEDICINE AND REHAB | Facility: CLINIC | Age: 64
End: 2024-09-25
Payer: OTHER MISCELLANEOUS

## 2024-09-25 DIAGNOSIS — M54.16 RADICULOPATHY, LUMBAR REGION: ICD-10-CM

## 2024-09-25 DIAGNOSIS — M54.50 LOW BACK PAIN, UNSPECIFIED: ICD-10-CM

## 2024-09-25 DIAGNOSIS — M51.36 OTHER INTERVERTEBRAL DISC DEGENERATION, LUMBAR REGION: ICD-10-CM

## 2024-09-25 DIAGNOSIS — M51.9 UNSPECIFIED THORACIC, THORACOLUMBAR AND LUMBOSACRAL INTERVERTEBRAL DISC DISORDER: ICD-10-CM

## 2024-09-25 DIAGNOSIS — M47.816 SPONDYLOSIS W/OUT MYELOPATHY OR RADICULOPATHY, LUMBAR REGION: ICD-10-CM

## 2024-09-25 DIAGNOSIS — M62.838 OTHER MUSCLE SPASM: ICD-10-CM

## 2024-09-25 PROCEDURE — 99213 OFFICE O/P EST LOW 20 MIN: CPT

## 2024-09-25 NOTE — PHYSICAL EXAM
[FreeTextEntry1] : Examination of the Lumbar Spine  Flexion 40 degrees Extension 5 degrees Lateral Bend R 15 degrees L 10 degrees   Palpation of the Lumbar Spine: Increasing tenderness and spasm bilateral lower lumbar paraspinal musculature trigger points involving bilateral gluteal musculature  MMT L/E: Intact  Reflexes 2 and symmetrical throughout  SLR positive at 20 degrees on the right SLR +30 degrees on the left [Normal] : Heart rate was normal and rhythm regular, normal S1 and S2, no gallops, no murmurs and no pericardial rub

## 2024-09-25 NOTE — ASSESSMENT
[FreeTextEntry1] : Home exercise plan reviewed with patient. Stressed the importance for the need for frequent change in position from sit to stand and stand to sit, as well as use of weight shifting techniques to alleviate low back discomfort. Instruction in proper posturing, body mechanics and lifting techniques.  Patient has been experiencing progressive low back pain for approximately 4 to 5 months.  Low back pain has been refractory to NSAID, muscle relaxant, as well as physical therapy.  It is imperative that a new MRI of her lumbar spine be authorized to allow for Appropriate treatment plan.  (I.e. pain management/neurosurgical evaluation) Continue with Flexeril and Arthrotec as needed Request MRI LS with IV sedation patient is severely claustrophobic.  At least 20 minutes was spent with patient face to face examining patient, reviewing findings/results, counseling patient and coordinating treatment program. Ample time was provided to answer any questions or address concerns to the patient's satisfaction.  Recheck 3 months.    [Indicate if, in your opinion, the incident that the patient described was the competent medical cause of this injury/illness.] : The incident that the patient described was the competent medical cause of this injury/illness: Yes [Indicate if the patient's complaints are consistent with his/her history of the injury/illness.] : Indicate if the patient's complaints are consistent with his/her history of the injury/illness: Yes [Yes] : Yes, it is consistent [Can the patient return to usual work activities as indicated? If yes, indicate date___] : The patient can return to usual work activities on [unfilled] [FreeTextEntry5] : 68

## 2024-09-25 NOTE — HISTORY OF PRESENT ILLNESS
[FreeTextEntry1] : Patient reports increasing low back pain as well as increasing radicular symptoms involving her bilateral lower extremities with greater involvement on the left.  Patient reports that she has been taking NSAIDs without any relief.  She has completed her course of physical therapy once again without any significant improvement reported.  Patient states that low back pain is aggravated with standing sitting as well as ambulation as well as any attempts at bending squatting or kneeling activities.  She reports that she is having increasing difficulty performing household chores as well as performing her job duties. [FreeTextEntry2] :  for School 02/17/2011  [FreeTextEntry6] : prolonged sitting [FreeTextEntry4] : no [FreeTextEntry5] : no [Has the patient missed work because of the injury/illness?] : The patient has missed work because of the injury/illness. [Yes] : The patient is currently working.

## 2024-10-26 ENCOUNTER — RESULT REVIEW (OUTPATIENT)
Age: 64
End: 2024-10-26

## 2024-11-11 ENCOUNTER — APPOINTMENT (OUTPATIENT)
Dept: OPHTHALMOLOGY | Facility: CLINIC | Age: 64
End: 2024-11-11

## 2024-11-20 ENCOUNTER — APPOINTMENT (OUTPATIENT)
Dept: PHYSICAL MEDICINE AND REHAB | Facility: CLINIC | Age: 64
End: 2024-11-20
Payer: OTHER MISCELLANEOUS

## 2024-11-20 DIAGNOSIS — M47.816 SPONDYLOSIS W/OUT MYELOPATHY OR RADICULOPATHY, LUMBAR REGION: ICD-10-CM

## 2024-11-20 DIAGNOSIS — M54.16 RADICULOPATHY, LUMBAR REGION: ICD-10-CM

## 2024-11-20 DIAGNOSIS — M62.838 OTHER MUSCLE SPASM: ICD-10-CM

## 2024-11-20 DIAGNOSIS — M51.369: ICD-10-CM

## 2024-11-20 DIAGNOSIS — M51.9 UNSPECIFIED THORACIC, THORACOLUMBAR AND LUMBOSACRAL INTERVERTEBRAL DISC DISORDER: ICD-10-CM

## 2024-11-20 DIAGNOSIS — M54.50 LOW BACK PAIN, UNSPECIFIED: ICD-10-CM

## 2024-11-20 PROCEDURE — 99213 OFFICE O/P EST LOW 20 MIN: CPT

## 2024-12-06 ENCOUNTER — APPOINTMENT (OUTPATIENT)
Dept: ORTHOPEDIC SURGERY | Facility: CLINIC | Age: 64
End: 2024-12-06
Payer: OTHER MISCELLANEOUS

## 2024-12-06 DIAGNOSIS — Z86.39 PERSONAL HISTORY OF OTHER ENDOCRINE, NUTRITIONAL AND METABOLIC DISEASE: ICD-10-CM

## 2024-12-06 DIAGNOSIS — E78.00 PURE HYPERCHOLESTEROLEMIA, UNSPECIFIED: ICD-10-CM

## 2024-12-06 DIAGNOSIS — M51.9 UNSPECIFIED THORACIC, THORACOLUMBAR AND LUMBOSACRAL INTERVERTEBRAL DISC DISORDER: ICD-10-CM

## 2024-12-06 PROCEDURE — 99204 OFFICE O/P NEW MOD 45 MIN: CPT

## 2024-12-24 ENCOUNTER — APPOINTMENT (OUTPATIENT)
Dept: PAIN MANAGEMENT | Facility: CLINIC | Age: 64
End: 2024-12-24
Payer: OTHER MISCELLANEOUS

## 2024-12-24 VITALS — BODY MASS INDEX: 41.23 KG/M2 | HEIGHT: 60 IN | WEIGHT: 210 LBS

## 2024-12-24 DIAGNOSIS — M51.9 UNSPECIFIED THORACIC, THORACOLUMBAR AND LUMBOSACRAL INTERVERTEBRAL DISC DISORDER: ICD-10-CM

## 2024-12-24 DIAGNOSIS — M51.369: ICD-10-CM

## 2024-12-24 DIAGNOSIS — Z87.39 PERSONAL HISTORY OF OTHER DISEASES OF THE MUSCULOSKELETAL SYSTEM AND CONNECTIVE TISSUE: ICD-10-CM

## 2024-12-24 DIAGNOSIS — M47.816 SPONDYLOSIS W/OUT MYELOPATHY OR RADICULOPATHY, LUMBAR REGION: ICD-10-CM

## 2024-12-24 DIAGNOSIS — M51.360: ICD-10-CM

## 2024-12-24 PROCEDURE — 99204 OFFICE O/P NEW MOD 45 MIN: CPT

## 2025-01-13 ENCOUNTER — APPOINTMENT (OUTPATIENT)
Dept: PHYSICAL MEDICINE AND REHAB | Facility: CLINIC | Age: 65
End: 2025-01-13

## 2025-01-29 ENCOUNTER — APPOINTMENT (OUTPATIENT)
Dept: PHYSICAL MEDICINE AND REHAB | Facility: CLINIC | Age: 65
End: 2025-01-29
Payer: OTHER MISCELLANEOUS

## 2025-01-29 DIAGNOSIS — M62.838 OTHER MUSCLE SPASM: ICD-10-CM

## 2025-01-29 DIAGNOSIS — M51.360: ICD-10-CM

## 2025-01-29 DIAGNOSIS — M54.16 RADICULOPATHY, LUMBAR REGION: ICD-10-CM

## 2025-01-29 PROCEDURE — 99213 OFFICE O/P EST LOW 20 MIN: CPT

## 2025-02-07 ENCOUNTER — APPOINTMENT (OUTPATIENT)
Dept: PAIN MANAGEMENT | Facility: CLINIC | Age: 65
End: 2025-02-07
Payer: OTHER MISCELLANEOUS

## 2025-02-07 DIAGNOSIS — M47.816 SPONDYLOSIS W/OUT MYELOPATHY OR RADICULOPATHY, LUMBAR REGION: ICD-10-CM

## 2025-02-07 PROCEDURE — 64494 INJ PARAVERT F JNT L/S 2 LEV: CPT | Mod: LT,59

## 2025-02-07 PROCEDURE — 64493 INJ PARAVERT F JNT L/S 1 LEV: CPT | Mod: RT,59

## 2025-02-07 PROCEDURE — J3490M: CUSTOM

## 2025-03-03 ENCOUNTER — APPOINTMENT (OUTPATIENT)
Dept: PAIN MANAGEMENT | Facility: CLINIC | Age: 65
End: 2025-03-03
Payer: OTHER MISCELLANEOUS

## 2025-03-03 VITALS — HEIGHT: 60 IN | WEIGHT: 215 LBS | BODY MASS INDEX: 42.21 KG/M2

## 2025-03-03 DIAGNOSIS — M47.816 SPONDYLOSIS W/OUT MYELOPATHY OR RADICULOPATHY, LUMBAR REGION: ICD-10-CM

## 2025-03-03 DIAGNOSIS — M54.51 VERTEBROGENIC LOW BACK PAIN: ICD-10-CM

## 2025-03-03 DIAGNOSIS — M51.360: ICD-10-CM

## 2025-03-03 PROCEDURE — 99214 OFFICE O/P EST MOD 30 MIN: CPT

## 2025-03-11 ENCOUNTER — NON-APPOINTMENT (OUTPATIENT)
Age: 65
End: 2025-03-11

## 2025-03-12 ENCOUNTER — NON-APPOINTMENT (OUTPATIENT)
Age: 65
End: 2025-03-12

## 2025-03-12 ENCOUNTER — APPOINTMENT (OUTPATIENT)
Dept: OPHTHALMOLOGY | Facility: CLINIC | Age: 65
End: 2025-03-12
Payer: COMMERCIAL

## 2025-03-12 PROCEDURE — 92014 COMPRE OPH EXAM EST PT 1/>: CPT

## 2025-03-19 ENCOUNTER — APPOINTMENT (OUTPATIENT)
Dept: PHYSICAL MEDICINE AND REHAB | Facility: CLINIC | Age: 65
End: 2025-03-19
Payer: OTHER MISCELLANEOUS

## 2025-03-19 DIAGNOSIS — M54.16 RADICULOPATHY, LUMBAR REGION: ICD-10-CM

## 2025-03-19 DIAGNOSIS — M51.360: ICD-10-CM

## 2025-03-19 DIAGNOSIS — Z86.39 PERSONAL HISTORY OF OTHER ENDOCRINE, NUTRITIONAL AND METABOLIC DISEASE: ICD-10-CM

## 2025-03-19 DIAGNOSIS — M47.816 SPONDYLOSIS W/OUT MYELOPATHY OR RADICULOPATHY, LUMBAR REGION: ICD-10-CM

## 2025-03-19 PROCEDURE — 99213 OFFICE O/P EST LOW 20 MIN: CPT

## 2025-03-19 RX ORDER — LIDOCAINE 5% 700 MG/1
5 PATCH TOPICAL
Qty: 30 | Refills: 1 | Status: ACTIVE | COMMUNITY
Start: 2025-03-19 | End: 1900-01-01

## 2025-04-22 ENCOUNTER — APPOINTMENT (OUTPATIENT)
Dept: PAIN MANAGEMENT | Facility: CLINIC | Age: 65
End: 2025-04-22

## 2025-05-06 ENCOUNTER — APPOINTMENT (OUTPATIENT)
Dept: PHYSICAL MEDICINE AND REHAB | Facility: CLINIC | Age: 65
End: 2025-05-06

## 2025-06-04 ENCOUNTER — APPOINTMENT (OUTPATIENT)
Dept: PHYSICAL MEDICINE AND REHAB | Facility: CLINIC | Age: 65
End: 2025-06-04
Payer: OTHER MISCELLANEOUS

## 2025-06-04 DIAGNOSIS — M62.838 OTHER MUSCLE SPASM: ICD-10-CM

## 2025-06-04 DIAGNOSIS — M51.360: ICD-10-CM

## 2025-06-04 DIAGNOSIS — M47.816 SPONDYLOSIS W/OUT MYELOPATHY OR RADICULOPATHY, LUMBAR REGION: ICD-10-CM

## 2025-06-04 DIAGNOSIS — M54.16 RADICULOPATHY, LUMBAR REGION: ICD-10-CM

## 2025-06-04 PROCEDURE — 99213 OFFICE O/P EST LOW 20 MIN: CPT

## 2025-06-30 ENCOUNTER — APPOINTMENT (OUTPATIENT)
Dept: PAIN MANAGEMENT | Facility: CLINIC | Age: 65
End: 2025-06-30
Payer: OTHER MISCELLANEOUS

## 2025-06-30 VITALS — BODY MASS INDEX: 42.21 KG/M2 | HEIGHT: 60 IN | WEIGHT: 215 LBS

## 2025-06-30 PROCEDURE — 99214 OFFICE O/P EST MOD 30 MIN: CPT

## 2025-08-13 ENCOUNTER — APPOINTMENT (OUTPATIENT)
Dept: PHYSICAL MEDICINE AND REHAB | Facility: CLINIC | Age: 65
End: 2025-08-13

## 2025-08-13 DIAGNOSIS — M62.838 OTHER MUSCLE SPASM: ICD-10-CM

## 2025-08-13 DIAGNOSIS — M54.16 RADICULOPATHY, LUMBAR REGION: ICD-10-CM

## 2025-08-13 PROCEDURE — 99213 OFFICE O/P EST LOW 20 MIN: CPT

## 2025-08-14 ENCOUNTER — APPOINTMENT (OUTPATIENT)
Dept: PAIN MANAGEMENT | Facility: CLINIC | Age: 65
End: 2025-08-14

## 2025-08-14 VITALS — WEIGHT: 219 LBS | HEIGHT: 60 IN | BODY MASS INDEX: 43 KG/M2

## 2025-08-14 DIAGNOSIS — M54.51 VERTEBROGENIC LOW BACK PAIN: ICD-10-CM

## 2025-08-14 DIAGNOSIS — M47.816 SPONDYLOSIS W/OUT MYELOPATHY OR RADICULOPATHY, LUMBAR REGION: ICD-10-CM

## 2025-08-14 DIAGNOSIS — M51.360: ICD-10-CM

## 2025-08-14 PROCEDURE — 99214 OFFICE O/P EST MOD 30 MIN: CPT

## 2025-09-15 ENCOUNTER — APPOINTMENT (OUTPATIENT)
Dept: PHYSICAL MEDICINE AND REHAB | Facility: CLINIC | Age: 65
End: 2025-09-15
Payer: OTHER MISCELLANEOUS

## 2025-09-15 DIAGNOSIS — M51.360: ICD-10-CM

## 2025-09-15 DIAGNOSIS — M47.816 SPONDYLOSIS W/OUT MYELOPATHY OR RADICULOPATHY, LUMBAR REGION: ICD-10-CM

## 2025-09-15 DIAGNOSIS — M54.51 VERTEBROGENIC LOW BACK PAIN: ICD-10-CM

## 2025-09-15 DIAGNOSIS — M54.16 RADICULOPATHY, LUMBAR REGION: ICD-10-CM

## 2025-09-15 PROCEDURE — 99213 OFFICE O/P EST LOW 20 MIN: CPT
